# Patient Record
(demographics unavailable — no encounter records)

---

## 2025-01-08 NOTE — HISTORY OF PRESENT ILLNESS
[FreeTextEntry1] : NP bumps on skin [de-identified] : 01/06/2025 04:18 PM JONATAN TALAVERA is a 8 year F presenting today for evaluation of the following:  Here with dad # bumpy skin, worse when she goes on vacation, improves with moisturization, uses Aquaphor but not everyday # "burning" sensation on lower lip x days

## 2025-01-08 NOTE — ASSESSMENT
[Use of independent historian: [ enter independent historian's relationship to patient ] :____] : As the patient was unable to provide a complete and reliable history, I obtained clinical history from the patient's [unfilled] [FreeTextEntry1] : # Keratosis pilaris, mild, chronic, not at treatment goal - education, counseling. We have discussed treatment options and expectations. Can be difficult to treat but with age can sometimes see improvement. - start urea cream BID (prescribed) --can start tretinoin at older age if remains of concern  # Xerosis cutis, generalized, chronic, not at treatment goal - Education and counseling - Gentle skin care reviewed  RTC as needed

## 2025-01-08 NOTE — HISTORY OF PRESENT ILLNESS
[FreeTextEntry1] : NP bumps on skin [de-identified] : 01/06/2025 04:18 PM JONATAN TALAVERA is a 8 year F presenting today for evaluation of the following:  Here with dad # bumpy skin, worse when she goes on vacation, improves with moisturization, uses Aquaphor but not everyday # "burning" sensation on lower lip x days